# Patient Record
Sex: FEMALE | Race: WHITE | NOT HISPANIC OR LATINO | Employment: FULL TIME | URBAN - METROPOLITAN AREA
[De-identification: names, ages, dates, MRNs, and addresses within clinical notes are randomized per-mention and may not be internally consistent; named-entity substitution may affect disease eponyms.]

---

## 2020-05-28 ENCOUNTER — TELEPHONE (OUTPATIENT)
Dept: OBSTETRICS AND GYNECOLOGY | Facility: CLINIC | Age: 20
End: 2020-05-28

## 2020-05-28 ENCOUNTER — OFFICE VISIT (OUTPATIENT)
Dept: OBSTETRICS AND GYNECOLOGY | Facility: CLINIC | Age: 20
End: 2020-05-28
Payer: COMMERCIAL

## 2020-05-28 VITALS
BODY MASS INDEX: 35.49 KG/M2 | HEIGHT: 65 IN | HEART RATE: 90 BPM | SYSTOLIC BLOOD PRESSURE: 115 MMHG | DIASTOLIC BLOOD PRESSURE: 73 MMHG | WEIGHT: 213 LBS

## 2020-05-28 DIAGNOSIS — A64 STD (FEMALE): ICD-10-CM

## 2020-05-28 DIAGNOSIS — Z20.2 STD EXPOSURE: Primary | ICD-10-CM

## 2020-05-28 DIAGNOSIS — N76.0 ACUTE VAGINITIS: ICD-10-CM

## 2020-05-28 PROCEDURE — 99213 OFFICE O/P EST LOW 20 MIN: CPT | Mod: 25,S$GLB,, | Performed by: OBSTETRICS & GYNECOLOGY

## 2020-05-28 PROCEDURE — 3008F PR BODY MASS INDEX (BMI) DOCUMENTED: ICD-10-PCS | Mod: CPTII,S$GLB,, | Performed by: OBSTETRICS & GYNECOLOGY

## 2020-05-28 PROCEDURE — 3008F BODY MASS INDEX DOCD: CPT | Mod: CPTII,S$GLB,, | Performed by: OBSTETRICS & GYNECOLOGY

## 2020-05-28 PROCEDURE — 87210 SMEAR WET MOUNT SALINE/INK: CPT | Mod: QW,S$GLB,, | Performed by: OBSTETRICS & GYNECOLOGY

## 2020-05-28 PROCEDURE — 87210 PR  SMEAR,STAIN,WET MNT,INTERP: ICD-10-PCS | Mod: QW,S$GLB,, | Performed by: OBSTETRICS & GYNECOLOGY

## 2020-05-28 PROCEDURE — 99213 PR OFFICE/OUTPT VISIT, EST, LEVL III, 20-29 MIN: ICD-10-PCS | Mod: 25,S$GLB,, | Performed by: OBSTETRICS & GYNECOLOGY

## 2020-05-28 RX ORDER — DEXTROAMPHETAMINE SULFATE, DEXTROAMPHETAMINE SACCHARATE, AMPHETAMINE SULFATE AND AMPHETAMINE ASPARTATE 7.5; 7.5; 7.5; 7.5 MG/1; MG/1; MG/1; MG/1
CAPSULE, EXTENDED RELEASE ORAL
COMMUNITY
Start: 2020-03-25

## 2020-05-28 RX ORDER — TINIDAZOLE 500 MG/1
2 TABLET ORAL ONCE
Qty: 8 TABLET | Refills: 0 | Status: SHIPPED | OUTPATIENT
Start: 2020-05-28 | End: 2020-05-28

## 2020-05-28 NOTE — TELEPHONE ENCOUNTER
----- Message from Ashley Rebolledo sent at 5/28/2020  9:32 AM CDT -----  Contact: Pt  Pt would like to consult with the nurse regarding possible exposure to an STD. Pt states that she had and yeast infection and UTI. Please call back at [phone]

## 2020-05-28 NOTE — PROGRESS NOTES
Subjective:       Patient ID: aWnder Stafford is a 19 y.o. female.    Chief Complaint:  Exposure to STD (Pt. states that she got a text from an [employer] number stating that they were a previous partner of hers and tested positive for an std. ) and Vaginal Itching (Went to urgent care last week and was treated for uti and yeast infection. Symptoms have cleared. )      History of Present Illness  She is not on birth control and does not want.  She got a text from an blaine that she might have an STD exposure    She wanted to get checked out      GYN & OB History  No LMP recorded.     Date of Last Pap: No result found    OB History    Para Term  AB Living   0 0 0 0 0 0   SAB TAB Ectopic Multiple Live Births   0 0 0 0 0       Review of Systems  Review of Systems   Constitutional: Negative for activity change.   Eyes: Negative for visual disturbance.   Respiratory: Negative for shortness of breath.    Cardiovascular: Negative for chest pain.   Gastrointestinal: Negative for abdominal pain.   Genitourinary: Negative for vaginal bleeding.        No abnormal vaginal bleeding   Musculoskeletal: Negative for back pain.   Integumentary:  Negative for rash and breast mass.   Neurological: Negative for numbness.   Psychiatric/Behavioral:        No mood disturbance or changes    Breast: Negative for mass            Objective:    Physical Exam:   Constitutional: She appears well-developed.               Genitourinary: Vagina normal and uterus normal. Pelvic exam was performed with patient supine. There is no tenderness or lesion on the right labia. There is no tenderness or lesion on the left labia. Cervix is normal. Labial bartholins normal.  Genitourinary Comments: Slide was examined under the microscope.     +++Clue cells  No Trichomonas  WBC rare   Hyphe  no               Neurological: She is alert.            Assessment:        1. STD (female)                 Plan:           Discussed the limitations of a pelvic  exam   rec serum eval and recheck in 3 months   Pap discussed will return for her annual     Pt is aware we call all results. If she does not hear from our office regarding her result within a week of having a study or procedure performed she is to call the office so that we can research the result for her.  Discussed follow up.  She is to rtn if her symptoms do not resolve or if persist  Chaperone was present

## 2020-05-28 NOTE — TELEPHONE ENCOUNTER
PT C/O OF BURNING, ITCHING, AND PAIN, WENT TO URGENT CARE, GAVE HER ANTIBIOTICS FOR UTI.  SYMPTOMS HAVE RESOLVED, BUT SHE STILL HAS ODOR.  ALSO FEELS LIKE SHE NEEDS TO BE CHECKED FOR AN STD.  PT ADVISED TO COME IN FOR APPT.

## 2020-06-01 LAB
CHLAMYDIA: NEGATIVE
GONORRHEA: NEGATIVE
SOURCE: NORMAL

## 2020-06-02 ENCOUNTER — TELEPHONE (OUTPATIENT)
Dept: OBSTETRICS AND GYNECOLOGY | Facility: CLINIC | Age: 20
End: 2020-06-02

## 2021-03-24 ENCOUNTER — OFFICE VISIT (OUTPATIENT)
Dept: OBSTETRICS AND GYNECOLOGY | Facility: CLINIC | Age: 21
End: 2021-03-24
Payer: COMMERCIAL

## 2021-03-24 ENCOUNTER — PROCEDURE VISIT (OUTPATIENT)
Dept: OBSTETRICS AND GYNECOLOGY | Facility: CLINIC | Age: 21
End: 2021-03-24
Payer: COMMERCIAL

## 2021-03-24 VITALS
WEIGHT: 228 LBS | SYSTOLIC BLOOD PRESSURE: 116 MMHG | BODY MASS INDEX: 38.53 KG/M2 | DIASTOLIC BLOOD PRESSURE: 79 MMHG | HEART RATE: 78 BPM

## 2021-03-24 DIAGNOSIS — Z01.419 WELL WOMAN EXAM WITH ROUTINE GYNECOLOGICAL EXAM: Primary | ICD-10-CM

## 2021-03-24 DIAGNOSIS — R19.00 PELVIC FULLNESS: ICD-10-CM

## 2021-03-24 PROCEDURE — 76856 PR  ECHO,PELVIC (NONOBSTETRIC): ICD-10-PCS | Mod: S$GLB,,, | Performed by: OBSTETRICS & GYNECOLOGY

## 2021-03-24 PROCEDURE — 99395 PREV VISIT EST AGE 18-39: CPT | Mod: S$GLB,,, | Performed by: OBSTETRICS & GYNECOLOGY

## 2021-03-24 PROCEDURE — 99395 PR PREVENTIVE VISIT,EST,18-39: ICD-10-PCS | Mod: S$GLB,,, | Performed by: OBSTETRICS & GYNECOLOGY

## 2021-03-24 PROCEDURE — 3008F PR BODY MASS INDEX (BMI) DOCUMENTED: ICD-10-PCS | Mod: CPTII,S$GLB,, | Performed by: OBSTETRICS & GYNECOLOGY

## 2021-03-24 PROCEDURE — 3008F BODY MASS INDEX DOCD: CPT | Mod: CPTII,S$GLB,, | Performed by: OBSTETRICS & GYNECOLOGY

## 2021-03-24 PROCEDURE — 76856 US EXAM PELVIC COMPLETE: CPT | Mod: S$GLB,,, | Performed by: OBSTETRICS & GYNECOLOGY

## 2021-03-26 LAB
CHLAMYDIA: NEGATIVE
GONORRHEA: NEGATIVE
SOURCE: NORMAL

## 2021-07-01 ENCOUNTER — PATIENT MESSAGE (OUTPATIENT)
Dept: ADMINISTRATIVE | Facility: OTHER | Age: 21
End: 2021-07-01

## 2021-07-19 LAB
RAPID GROUP A STREP (OHS): NEGATIVE
SARS-COV-2 RNA RESP QL NAA+PROBE: NEGATIVE

## 2022-04-09 ENCOUNTER — HISTORICAL (OUTPATIENT)
Dept: ADMINISTRATIVE | Facility: HOSPITAL | Age: 22
End: 2022-04-09
Payer: COMMERCIAL

## 2022-04-27 VITALS
BODY MASS INDEX: 38.75 KG/M2 | SYSTOLIC BLOOD PRESSURE: 132 MMHG | WEIGHT: 232.56 LBS | DIASTOLIC BLOOD PRESSURE: 95 MMHG | HEIGHT: 65 IN | OXYGEN SATURATION: 99 %

## 2022-09-16 ENCOUNTER — HISTORICAL (OUTPATIENT)
Dept: ADMINISTRATIVE | Facility: HOSPITAL | Age: 22
End: 2022-09-16
Payer: COMMERCIAL

## 2022-11-03 ENCOUNTER — PATIENT MESSAGE (OUTPATIENT)
Dept: OBSTETRICS AND GYNECOLOGY | Facility: CLINIC | Age: 22
End: 2022-11-03
Payer: COMMERCIAL

## 2022-11-03 DIAGNOSIS — R79.1 ABNORMAL BLEEDING TIME: ICD-10-CM

## 2022-11-03 DIAGNOSIS — R10.2 PELVIC PAIN: Primary | ICD-10-CM

## 2022-11-04 ENCOUNTER — PROCEDURE VISIT (OUTPATIENT)
Dept: OBSTETRICS AND GYNECOLOGY | Facility: CLINIC | Age: 22
End: 2022-11-04
Payer: COMMERCIAL

## 2022-11-04 DIAGNOSIS — R10.2 PELVIC PAIN: ICD-10-CM

## 2022-11-04 DIAGNOSIS — R79.1 ABNORMAL BLEEDING TIME: ICD-10-CM

## 2022-11-04 PROCEDURE — 76830 US OB/GYN PROCEDURE (VIEWPOINT): ICD-10-PCS | Mod: S$GLB,,, | Performed by: OBSTETRICS & GYNECOLOGY

## 2022-11-04 PROCEDURE — 76830 TRANSVAGINAL US NON-OB: CPT | Mod: S$GLB,,, | Performed by: OBSTETRICS & GYNECOLOGY

## 2022-11-11 ENCOUNTER — PATIENT MESSAGE (OUTPATIENT)
Dept: OBSTETRICS AND GYNECOLOGY | Facility: CLINIC | Age: 22
End: 2022-11-11
Payer: COMMERCIAL

## 2023-05-10 DIAGNOSIS — B37.31 VAGINAL YEAST INFECTION: Primary | ICD-10-CM

## 2023-05-10 RX ORDER — FLUCONAZOLE 150 MG/1
150 TABLET ORAL EVERY OTHER DAY
Qty: 2 TABLET | Refills: 2 | Status: SHIPPED | OUTPATIENT
Start: 2023-05-10

## 2023-05-10 NOTE — TELEPHONE ENCOUNTER
Patient c/o vaginal yeast infection, she is requesting medication. Allergies reviewed. Pharmacy up to date. Medication pending. Please approve.

## 2023-05-10 NOTE — TELEPHONE ENCOUNTER
----- Message from Negrita Marj sent at 5/10/2023  9:33 AM CDT -----  Contact: Patient  Patient called to consult with nurse or staff regarding an issue she is having. She states she would like medication called out for a yeast infection if possible. She would like this medication called out to [facility], [address] [place] [address], [phone]. Patient would laura a call back and can be reached at [phone]. Thanks/MR

## 2023-05-15 ENCOUNTER — TELEPHONE (OUTPATIENT)
Dept: OBSTETRICS AND GYNECOLOGY | Facility: CLINIC | Age: 23
End: 2023-05-15
Payer: COMMERCIAL

## 2023-05-15 NOTE — TELEPHONE ENCOUNTER
Spoke to patient, she states she was given diflucan to treat for yeast infection but her symptoms have not improved. Advised patient to come in for evaluation tomorrow morning. Patient verbalized understanding.

## 2023-05-15 NOTE — TELEPHONE ENCOUNTER
----- Message from Janice Franco sent at 5/15/2023 10:44 AM CDT -----  Regarding: Problems and Concerns  Contact: patient  Per phone call with patient, she stated that she had call for yeast infection medication and it is getting a little better and she has come concerns and would like for the nurse to give her a return call.  Please return call at [phone] (home).    Thanks,  SJ

## 2024-03-04 ENCOUNTER — TELEPHONE (OUTPATIENT)
Dept: OBSTETRICS AND GYNECOLOGY | Facility: CLINIC | Age: 24
End: 2024-03-04
Payer: COMMERCIAL

## 2024-03-04 NOTE — TELEPHONE ENCOUNTER
----- Message from Susan Aguilar sent at 3/4/2024  1:14 PM CST -----  Contact: self  Type: Staff Message    Caller: Wander Stafford  Call Back Number: [phone]  Nature of the Call: pt returning a missed call  Additional Information: na      
Called patient with Dr. Carmona's recommendation of Dr. Keyon Spicer.   
titers do not indicate a need to immunize

## 2024-03-04 NOTE — TELEPHONE ENCOUNTER
Phone call returned to patient. No answer. I did not leave a message and will attempt to reach at a later time.

## 2024-03-04 NOTE — TELEPHONE ENCOUNTER
----- Message from Windy Ray sent at 3/4/2024  8:40 AM CST -----  Type:  Needs Medical Advice    Who Called: Wander Stafford    Symptoms (please be specific): NOB    How long has patient had these symptoms:  -  Pharmacy name and phone #:  -  Would the patient rather a call back or a response via [facility]? CB   Best Call Back Number: [phone]  Additional Information:  pt wants to know if you can refer her to a OB Dr in [place] ( she's moved) please call

## 2024-03-12 ENCOUNTER — TELEPHONE (OUTPATIENT)
Dept: OBSTETRICS AND GYNECOLOGY | Facility: CLINIC | Age: 24
End: 2024-03-12
Payer: COMMERCIAL

## 2024-03-12 DIAGNOSIS — Z34.90 PREGNANCY, UNSPECIFIED GESTATIONAL AGE: Primary | ICD-10-CM

## 2024-03-12 DIAGNOSIS — Z3A.01 LESS THAN 8 WEEKS GESTATION OF PREGNANCY: Primary | ICD-10-CM

## 2024-03-12 NOTE — TELEPHONE ENCOUNTER
Phone call returned to patient  LMP  02/02/2024  G 1   Established with Dr. Carmona  BC/ BS  OB INTAKE scheduled  She lives in [place] and would like to be seen next Wedmesday 03/20/2024

## 2024-03-15 LAB
ABS NRBC COUNT: 0 X 10 3/UL (ref 0–0.01)
ABSOLUTE BASOPHIL: 0.05 X 10 3/UL (ref 0–0.22)
ABSOLUTE EOSINOPHIL: 0.21 X 10 3/UL (ref 0.04–0.54)
ABSOLUTE IMMATURE GRAN: 0.03 X 10 3/UL (ref 0–0.04)
ABSOLUTE LYMPHOCYTE: 2.79 X 10 3/UL (ref 0.86–4.75)
ABSOLUTE MONOCYTE: 0.51 X 10 3/UL (ref 0.22–1.08)
AMORPH URATE CRY URNS QL MICRO: ABNORMAL
ANTIBODY SCREEN: NEGATIVE
B-HCG SERPL-ACNC: 3526 MIU/ML
BACTERIA #/AREA URNS HPF: ABNORMAL /[HPF]
BASOPHILS NFR BLD: 0.5 % (ref 0.2–1.2)
BILIRUB UR QL STRIP: NEGATIVE
BLOOD GROUPING: NORMAL
BLOOD TYPE (D): POSITIVE
CLARITY UR: ABNORMAL
COLOR UR: YELLOW
EOSINOPHIL NFR BLD: 2.2 % (ref 0.7–7)
EPITHELIAL CELLS: ABNORMAL
GLUCOSE (UA): NEGATIVE MG/DL
HBV SURFACE AG SERPL QL IA: NONREACTIVE
HCT VFR BLD AUTO: 39.8 % (ref 37–47)
HCV IGG SERPL QL IA: NONREACTIVE
HGB BLD-MCNC: 12.8 G/DL (ref 12–16)
HIV 1+2 AB+HIV1 P24 AG SERPL QL IA: NONREACTIVE
HSV1 IGG SER-ACNC: NONREACTIVE
HSV2 IGG SER-ACNC: REACTIVE
IMMATURE GRANULOCYTES: 0.3 % (ref 0–0.5)
KETONES UR QL STRIP: NEGATIVE MG/DL
LEUKOCYTE ESTERASE UR QL STRIP: ABNORMAL
LYMPHOCYTES NFR BLD: 28.6 % (ref 19.3–53.1)
MCH RBC QN AUTO: 26.9 PG (ref 27–32)
MCHC RBC AUTO-ENTMCNC: 32.2 G/DL (ref 32–36)
MCV RBC AUTO: 83.8 FL (ref 82–100)
MONOCYTES NFR BLD: 5.2 % (ref 4.7–12.5)
MUCOUS THREADS URNS QL MICRO: NEGATIVE
NEUTROPHILS # BLD AUTO: 6.15 X 10 3/UL (ref 2.15–7.56)
NEUTROPHILS NFR BLD: 63.2 % (ref 34–71.1)
NITRITE UR QL STRIP: NEGATIVE
NUCLEATED RED BLOOD CELLS: 0 /100 WBC (ref 0–0.2)
OCCULT BLOOD: NEGATIVE
PH, URINE: 8 (ref 5–7.5)
PLATELET # BLD AUTO: 300 X 10 3/UL (ref 135–400)
PROGEST SERPL-MCNC: 20.1 NG/ML
PROT UR QL STRIP: NEGATIVE MG/DL
RBC # BLD AUTO: 4.75 X 10 6/UL (ref 4.2–5.4)
RBC/HPF: NEGATIVE
RDW-SD: 44.9 FL (ref 37–54)
RUBELLA IGG SCREEN: NORMAL
SP GR UR STRIP: 1.01 (ref 1–1.03)
SYPHILIS TREPONEMAL ANTIBODY: NONREACTIVE
T4, FREE: 0.92 NG/DL (ref 0.93–1.7)
TSH SERPL DL<=0.005 MIU/L-ACNC: 1.97 UIU/ML (ref 0.27–4.2)
UROBILINOGEN, URINE: NORMAL E.U./DL (ref 0–1)
WBC # BLD: 9.74 X 10 3/UL (ref 4.3–10.8)
WBC/HPF: ABNORMAL

## 2024-03-20 ENCOUNTER — CLINICAL SUPPORT (OUTPATIENT)
Dept: OBSTETRICS AND GYNECOLOGY | Facility: CLINIC | Age: 24
End: 2024-03-20
Payer: COMMERCIAL

## 2024-03-20 VITALS
SYSTOLIC BLOOD PRESSURE: 128 MMHG | HEART RATE: 79 BPM | WEIGHT: 258 LBS | DIASTOLIC BLOOD PRESSURE: 82 MMHG | BODY MASS INDEX: 42.99 KG/M2

## 2024-03-20 DIAGNOSIS — Z34.90 PREGNANCY, UNSPECIFIED GESTATIONAL AGE: Primary | ICD-10-CM

## 2024-03-20 DIAGNOSIS — E66.9 OBESITY, UNSPECIFIED CLASSIFICATION, UNSPECIFIED OBESITY TYPE, UNSPECIFIED WHETHER SERIOUS COMORBIDITY PRESENT: ICD-10-CM

## 2024-03-20 NOTE — PROGRESS NOTES
OB INTAKE   OB labs discussed with patient  HSV 2 positive   She is advised will start Valtrex at 35 weeks.   Patient was unaware and has denied any outbreak.  GDM screening due to family history and obesity  Prenatal Genetic Testing offered and considering.  She does have family history of Down's and Wilson's syndrome. I did encourage patient to have the [facility] Genetic Testing at 12 weeks.  Smoking cessation discussed with patient

## 2024-03-21 ENCOUNTER — PATIENT MESSAGE (OUTPATIENT)
Dept: OBSTETRICS AND GYNECOLOGY | Facility: CLINIC | Age: 24
End: 2024-03-21
Payer: COMMERCIAL

## 2024-03-21 LAB — B-HCG SERPL-ACNC: NORMAL MIU/ML

## 2024-03-22 ENCOUNTER — NURSE TRIAGE (OUTPATIENT)
Dept: ADMINISTRATIVE | Facility: CLINIC | Age: 24
End: 2024-03-22
Payer: COMMERCIAL

## 2024-03-23 NOTE — TELEPHONE ENCOUNTER
"Patient reports she is 7 weeks pregnant and spotting since last night. Patient reports brown d/c and a small blood clot on toilet paper when she wiped just now. Patient denies any abdominal pain, fever, other abnormal vaginal symptoms, or bleeding. Patient reports masturbating x3 yesterday. Advised patient of dispo of home care. Verbalized understanding. Advised to call back if symptoms become worse or with further questions.       Reason for Disposition   Normal vaginal discharge in pregnancy   SPOTTING (single or brief episode)    Additional Information   Negative: [1] Pregnant 23 or more weeks AND [2] baby is moving less today (e.g., kick count < 5 in 1 hour or < 10 in 2 hours)   Negative: Patient sounds very sick or weak to the triager   Negative: [1] Constant abdominal pain AND [2] present > 2 hours   Negative: [1] Intermittent lower abdominal pain AND [2] present > 24 hours   Negative: [1] Pregnant 24-36 weeks () AND [2] pinkish or brownish mucous discharge   Negative: [1] Yellow or green vaginal discharge AND [2] fever   Negative: Painful rash with tiny water blisters   Negative: [1] Rash (e.g., redness, tiny bumps, sore) of genital area AND [2] present > 24 hours   Negative: Abnormal color vaginal discharge (i.e., yellow, green, gray)   Negative: Bad smelling vaginal discharge   Negative: Tender lump (swelling or "ball") at vaginal opening   Negative: [1] Symptoms of a "yeast infection" (i.e., itchy, white discharge, not bad smelling) AND [2] not improved > 3 days following Care Advice   Negative: Patient is worried they have a sexually transmitted infection (STI)   Negative: Pain with sexual intercourse (dyspareunia)   Negative: [1] Pregnant 37 or more weeks (term) AND [2] pinkish or brownish mucous discharge   Negative: [1] Pregnant 37 or more weeks (term) AND [2] passed a small glob or chunk of mucous (may look like gelatin or snot)   Negative: [1] Rash (e.g., redness, tiny bumps, sore) of genital " "area AND [2] present < 24 hours   Negative: Symptoms of a vaginal yeast infection (i.e., white, thick, cottage-cheese-like, itchy, not bad smelling discharge)   Negative: Shock suspected (e.g., cold/pale/clammy skin, too weak to stand, low BP, rapid pulse)   Negative: Difficult to awaken or acting confused (e.g., disoriented, slurred speech)   Negative: Passed out (i.e., lost consciousness, collapsed and was not responding)   Negative: Sounds like a life-threatening emergency to the triager   Negative: SEVERE abdominal pain   Negative: SEVERE vaginal bleeding (e.g., soaking 2 pads per hour or large blood clots and present 2 or more hours)   Negative: SEVERE dizziness (e.g., unable to stand, requires support to walk, feels like passing out)   Negative: [1] MODERATE vaginal bleeding (e.g., soaking 1 pad per hour; clots) AND [2] present > 6 hours   Negative: [1] MODERATE vaginal bleeding (e.g., soaking 1 pad per hour; clots) AND [2] pregnant > 12 weeks   Negative: Passed tissue (e.g., gray-white)   Negative: Shoulder pain   Negative: Pale skin (pallor) of new-onset or worsening   Negative: Patient sounds very sick or weak to the triager   Negative: [1] Constant abdominal pain AND [2] present > 2 hours   Negative: Fever 100.4 F (38.0 C) or higher   Negative: [1] Intermittent lower abdominal pain (e.g., cramping) AND [2] present > 24 hours   Negative: Prior history of "ectopic pregnancy" or previous tubal surgery (e.g., tubal ligation)   Negative: Pain or burning with passing urine (urination)   Negative: Using heparin (e.g., Lovenox) or other strong blood thinner, or known bleeding disorder (e.g., thrombocytopenia)   Negative: MODERATE vaginal bleeding (e.g., soaking 1 pad per hour; clots)   Negative: Has IUD   Negative: MILD vaginal bleeding (i.e., less than 1 pad / hour; less than patient's usual menstrual bleeding; not just spotting)   Negative: SPOTTING lasts > 48 hours or spotting happens more than once in a week   " Negative: Unusual vaginal discharge (e.g., bad smelling, yellow, green, or foamy-white)   Negative: Not feeling pregnant any longer (e.g., breast tenderness or nausea has disappeared)   Negative: SPOTTING after sexual intercourse (single or brief episode)   Negative: SPOTTING after a pelvic examination (single or brief episode)    Protocols used: Pregnancy - Vaginal Ckugqozbr-U-XT, Pregnancy - Vaginal Bleeding Less Than 20 Weeks EGA-A-AH

## 2024-03-25 ENCOUNTER — PATIENT MESSAGE (OUTPATIENT)
Dept: OBSTETRICS AND GYNECOLOGY | Facility: CLINIC | Age: 24
End: 2024-03-25
Payer: COMMERCIAL

## 2024-03-28 ENCOUNTER — PROCEDURE VISIT (OUTPATIENT)
Dept: OBSTETRICS AND GYNECOLOGY | Facility: CLINIC | Age: 24
End: 2024-03-28
Payer: COMMERCIAL

## 2024-03-28 DIAGNOSIS — Z34.90 PREGNANCY, UNSPECIFIED GESTATIONAL AGE: ICD-10-CM

## 2024-03-28 PROCEDURE — 76817 TRANSVAGINAL US OBSTETRIC: CPT | Mod: S$GLB,,, | Performed by: OBSTETRICS & GYNECOLOGY

## 2024-04-09 DIAGNOSIS — K20.90 ESOPHAGITIS: ICD-10-CM

## 2024-04-09 DIAGNOSIS — O21.9 VOMITING AFFECTING PREGNANCY: Primary | ICD-10-CM

## 2024-04-09 RX ORDER — ONDANSETRON 8 MG/1
8 TABLET, ORALLY DISINTEGRATING ORAL EVERY 8 HOURS PRN
Qty: 30 TABLET | Refills: 1 | Status: SHIPPED | OUTPATIENT
Start: 2024-04-09

## 2024-04-09 NOTE — TELEPHONE ENCOUNTER
Wander,     I am sorry you are feeling bad.  We can send out a prescription of Zofran to treat nausea and vomiting.  It is not uncommon to have a small amount of blood from frequent vomiting.  You may also take Nexium 20 mg early morning dose. This medication is over the counter.  If your symptoms do not improve with the Zofran please reach out to me.  Hope you feel better soon!    Jacque   ===View-only below this line===      ----- Message -----       From:Wander Stafford       Sent:4/9/2024  8:52 AM CDT         To:User Message Message List    Subject:spotting    Goodmorning, I was just messaging because I was throwing up this morning and it was all stomach acid but there was a little bit of blood in it too. I got sick a lot yesterday and it was basically all stomach acid so I figured maybe it just irritated my esophagus but I just wanted to see what you think like if thats normal or okay       ----- Message -----       From:Nurse Santillan       Sent:4/1/2024 11:48 AM CDT         To:Wander Stafford    Subject:spotting    Danilo,    I scheduled you for Wednesday May 1 at 2:00  See you then!    Jacque       ----- Message -----       From:Wander Stafford       Sent:4/1/2024  9:27 AM CDT         To:User Message Message List    Subject:spotting    Wednesdays at whatever time preferably early afternoon       ----- Message -----       From:Nurse Santillan       Sent:4/1/2024  9:19 AM CDT         To:Wander Stafford    Subject:spotting    Good morning Wander,     What day and time of day works best for you?    Jacque       ----- Message -----       From:Wander Stafford       Sent:3/30/2024  3:12 PM CDT         To:User Message Message List    Subject:spotting    I will do the genetic testing at 12 weeks if we can go ahead and get an appointment set so I can take off !       ----- Message -----       From:Nurse Santillan       Sent:3/26/2024  8:36 AM CDT         To:Wander Stafford    Subject:spotting    Okay great!  I hope you have a great day!    Jacque

## 2024-04-15 ENCOUNTER — INITIAL PRENATAL (OUTPATIENT)
Dept: OBSTETRICS AND GYNECOLOGY | Facility: CLINIC | Age: 24
End: 2024-04-15
Payer: COMMERCIAL

## 2024-04-15 VITALS
DIASTOLIC BLOOD PRESSURE: 85 MMHG | SYSTOLIC BLOOD PRESSURE: 121 MMHG | WEIGHT: 255 LBS | HEART RATE: 85 BPM | BODY MASS INDEX: 42.49 KG/M2

## 2024-04-15 DIAGNOSIS — Z3A.01 LESS THAN 8 WEEKS GESTATION OF PREGNANCY: ICD-10-CM

## 2024-04-15 DIAGNOSIS — Z34.90 PREGNANCY, UNSPECIFIED GESTATIONAL AGE: Primary | ICD-10-CM

## 2024-04-15 PROCEDURE — 0500F INITIAL PRENATAL CARE VISIT: CPT | Mod: CPTII,S$GLB,, | Performed by: OBSTETRICS & GYNECOLOGY

## 2024-04-15 RX ORDER — ONDANSETRON 8 MG/1
8 TABLET, ORALLY DISINTEGRATING ORAL EVERY 8 HOURS PRN
Qty: 18 TABLET | Refills: 1 | Status: SHIPPED | OUTPATIENT
Start: 2024-04-15

## 2024-04-15 NOTE — PROGRESS NOTES
CHIEF COMPLAINT:   Patient presents with      initial OB        HISTORY OF PRESENT ILLNESS  Wander Stafford 23 y.o.  presents for initial OB  The patient has no complaints today.  Nausea discussed and fluid intake including sprite, gator aid, cool aid,    No bleeding or pain.    Genetic testing is discussed in detail with her .   Denies chemical/pesticide/radiation exposure.    OB history:  G1    LMP: Patient's last menstrual period was 2024.  EDC: Estimated Date of Delivery: 24  EGA: 9w5d       Health Maintenance   Topic Date Due    Lipid Panel  Never done    Pap Smear  2022    Chlamydia Screening  2024    TETANUS VACCINE  2033    Hepatitis C Screening  Completed       Past Medical History:   Diagnosis Date    HSV-2 infection     Possible exposure to STD     Yeast infection        Past Surgical History:   Procedure Laterality Date    WISDOM TOOTH EXTRACTION         Family History   Problem Relation Name Age of Onset    Diabetes Father      Diabetes Mellitus Other          UNSPECIFIED    Ovarian cancer Maternal Great-Grandmother      Breast cancer Neg Hx      Cancer Neg Hx      Colon cancer Neg Hx      Eclampsia Neg Hx      Hypertension Neg Hx      Miscarriages / Stillbirths Neg Hx       labor Neg Hx      Stroke Neg Hx      Uterine cancer Neg Hx      Cervical cancer Neg Hx         Social History     Socioeconomic History    Marital status: Single   Tobacco Use    Smoking status: Former     Types: Vaping with nicotine    Smokeless tobacco: Never    Tobacco comments:     Patient counseled smoking cessation   Substance and Sexual Activity    Alcohol use: Not Currently    Drug use: Yes     Types: Marijuana    Sexual activity: Yes     Partners: Male     Birth control/protection: None       Current Outpatient Medications   Medication Sig Dispense Refill    ondansetron (ZOFRAN-ODT) 8 MG TbDL Take 1 tablet (8 mg total) by mouth every 8 (eight) hours as needed (NAUSEA). 30 tablet 1     prenatal vit37/iron/folic acid (PRENATA ORAL) Take by mouth.      ADDERALL XR 30 mg 24 hr capsule       fluconazole (DIFLUCAN) 150 MG Tab Take 1 tablet (150 mg total) by mouth every other day. 2 tablet 2    norethindrone-e.estradiol-iron (LO LOESTRIN FE ORAL) Take by mouth.       No current facility-administered medications for this visit.       Review of patient's allergies indicates:  No Known Allergies      PHYSICAL EXAM   Vitals:    04/15/24 0933   BP: 121/85   Pulse: 85        PAIN SCALE: 0/10 None    PHYSICAL EXAM    ROS:  GENERAL: No fever, chills, fatigability.  CV: Denies chest pain  PULM: Denies shortness of breath or wheezing.  ABDOMEN denies  abdominal pain, hematemesis or blood in stool.  URINARY: No flank pain, dysuria or hematuria.  REPRODUCTIVE: No abnormal vaginal bleeding.       PE:   APPEARANCE: Well nourished, well developed, in no acute distress  CHEST: Clear to auscultation bilaterally  CV: Regular rate and rhythm  BREASTS: Symmetrical, no skin changes or visible lesions. No palpable masses, nipple discharge or adenopathy bilaterally. Mild bilateral fibrocystic changes  ABDOMEN: Soft. No tenderness or masses. No hepatosplenomegaly. No hernias  PELVIC:   VULVA: No lesions. Normal female genitalia.  URETHRAL MEATUS: Normal size and location, no lesions, no prolapse.  URETHRA: No masses, tenderness, prolapse or scarring.  VAGINA: Moist and well rugated, no discharge, no significant cystocele or rectocele.  CERVIX: No lesions, normal diameter, no stenosis, no cervical motion tenderness.   UTERUS: 10 size, regular shape, mobile, non-tender, normal position, good support.  ADNEXA: No masses, tenderness or CDS nodularity.  ANUS PERINEUM: No lesions, no relaxation, no external hemorrhoids.     UPT +    A/P:     -      Patient was counseled today on A.C.S. Pap guidelines and recommendations for yearly pelvic exams, mammograms and monthly self breast exams; to see her PCP for other health maintenance  and pregnancy.   -      Patient's medications and medical history reviewed with patient and implications in pregnancy.   -      Pregnancy course discussed and 'AtoZ' book given. Patient was counseled on proper weight gain based on the [employer]'s recommendations based on her pre-pregnancy weight. Discussed foods to avoid in pregnancy (i.e. sushi, fish that are high in mercury, deli meat, and unpasteurized cheeses). Discussed prenatal vitamin options (i.e. stool softener, DHA). Discussed potential medical problems in pregnancy.  -     Discussed risk of Toxoplasmosis transmission from pets and reviewed risk reduction techniques.  -      -     Chromosomal abnormality risk discussed and available testing offered - declined  This is discussed.      -     Pt was counseled on exercise in pregnancy and weight gain recommendations.  -     Pt was counseled on travel recommendations and on risks of Covid 19 virus exposure.    -     Covid prevention reviewed with patient  -     Oriented to practice including call coverage   -     Follow-up routine OB  labs and u/s-- .   -       Pt is aware we call all results. If she does not hear from our office regarding her result within a week of having a study or procedure performed she is to call the office so that we can research the result for her as I do not know when she is scheduling her procedures.

## 2024-04-17 LAB
CHLAMYDIA: NEGATIVE
CHLAMYDIA: NEGATIVE
GONORRHEA: NEGATIVE
GONORRHEA: NEGATIVE
Lab: NORMAL
SOURCE: NORMAL
SOURCE: NORMAL

## 2024-04-19 ENCOUNTER — TELEPHONE (OUTPATIENT)
Dept: OBSTETRICS AND GYNECOLOGY | Facility: CLINIC | Age: 24
End: 2024-04-19
Payer: COMMERCIAL

## 2024-04-19 NOTE — TELEPHONE ENCOUNTER
----- Message from Silvio Carmona III, MD sent at 4/18/2024  5:54 PM CDT -----  I want to let you know that your pap smear is normal

## 2024-04-25 ENCOUNTER — PATIENT MESSAGE (OUTPATIENT)
Dept: OBSTETRICS AND GYNECOLOGY | Facility: CLINIC | Age: 24
End: 2024-04-25
Payer: COMMERCIAL

## 2024-04-25 ENCOUNTER — TELEPHONE (OUTPATIENT)
Dept: OBSTETRICS AND GYNECOLOGY | Facility: CLINIC | Age: 24
End: 2024-04-25
Payer: COMMERCIAL

## 2024-04-25 NOTE — TELEPHONE ENCOUNTER
I have returned patient's call once again.  No answer. I left a message requesting patient reach out in the Patient Portal. Give me a date you would like to reschedule your appointment. I will wait to hear from patient.

## 2024-04-25 NOTE — TELEPHONE ENCOUNTER
----- Message from Iris Mcguire sent at 4/25/2024 10:07 AM CDT -----  Patient is requesting a call back in regard to visit scheduled on 5/1/24 please call her back at [phone]

## 2024-04-25 NOTE — TELEPHONE ENCOUNTER
----- Message from Balbina Ambriz sent at 4/25/2024 11:34 AM CDT -----  Regarding: missed call  Contact: pt  Pt returning a missed call and can be reached at [phone]    Thanks,

## 2024-04-25 NOTE — TELEPHONE ENCOUNTER
Phone call returned to patient. No answer. I left a message on patient's voicemail to return call   I will attempt to reach at a later time.

## 2024-04-29 ENCOUNTER — CLINICAL SUPPORT (OUTPATIENT)
Dept: OBSTETRICS AND GYNECOLOGY | Facility: CLINIC | Age: 24
End: 2024-04-29
Payer: COMMERCIAL

## 2024-04-29 DIAGNOSIS — Z34.90 PREGNANCY, UNSPECIFIED GESTATIONAL AGE: Primary | ICD-10-CM

## 2024-04-29 NOTE — PROGRESS NOTES
Patient is in to discuss Prenatal Genetic Testing.   [facility] ordered.  Patient is advised to have drawn at 12 weeks gestation.  This study takes two weeks to process.

## 2024-05-04 ENCOUNTER — PATIENT MESSAGE (OUTPATIENT)
Dept: OBSTETRICS AND GYNECOLOGY | Facility: CLINIC | Age: 24
End: 2024-05-04
Payer: COMMERCIAL

## 2024-05-07 ENCOUNTER — PATIENT MESSAGE (OUTPATIENT)
Dept: OBSTETRICS AND GYNECOLOGY | Facility: CLINIC | Age: 24
End: 2024-05-07
Payer: COMMERCIAL

## 2024-05-29 ENCOUNTER — CLINICAL SUPPORT (OUTPATIENT)
Dept: OBSTETRICS AND GYNECOLOGY | Facility: CLINIC | Age: 24
End: 2024-05-29
Payer: COMMERCIAL

## 2024-05-29 ENCOUNTER — PATIENT MESSAGE (OUTPATIENT)
Dept: OTHER | Facility: OTHER | Age: 24
End: 2024-05-29
Payer: COMMERCIAL

## 2024-05-29 VITALS
WEIGHT: 255.81 LBS | DIASTOLIC BLOOD PRESSURE: 77 MMHG | SYSTOLIC BLOOD PRESSURE: 115 MMHG | HEART RATE: 92 BPM | BODY MASS INDEX: 42.62 KG/M2

## 2024-05-29 DIAGNOSIS — O99.210 OBESITY IN PREGNANCY: ICD-10-CM

## 2024-05-29 DIAGNOSIS — Z3A.20 20 WEEKS GESTATION OF PREGNANCY: Primary | ICD-10-CM

## 2024-05-29 NOTE — PROGRESS NOTES
Patient is complaining of hip and back pain   She works as a , on her feet and carrying trays  Denies urinary symptoms  She is referred to Dr. Bárbara Oneal, Chiropractor  Denies HSV outbreak  Patient is reminded to have the One Hour GTT

## 2024-06-05 ENCOUNTER — PATIENT MESSAGE (OUTPATIENT)
Dept: OTHER | Facility: OTHER | Age: 24
End: 2024-06-05
Payer: COMMERCIAL

## 2024-06-20 ENCOUNTER — PATIENT MESSAGE (OUTPATIENT)
Dept: OBSTETRICS AND GYNECOLOGY | Facility: CLINIC | Age: 24
End: 2024-06-20
Payer: COMMERCIAL

## 2024-06-24 ENCOUNTER — ROUTINE PRENATAL (OUTPATIENT)
Dept: OBSTETRICS AND GYNECOLOGY | Facility: CLINIC | Age: 24
End: 2024-06-24
Payer: COMMERCIAL

## 2024-06-24 VITALS
WEIGHT: 259 LBS | DIASTOLIC BLOOD PRESSURE: 87 MMHG | BODY MASS INDEX: 43.15 KG/M2 | SYSTOLIC BLOOD PRESSURE: 138 MMHG | HEART RATE: 99 BPM

## 2024-06-24 DIAGNOSIS — Z34.90 PREGNANCY, UNSPECIFIED GESTATIONAL AGE: Primary | ICD-10-CM

## 2024-06-24 PROCEDURE — 0502F SUBSEQUENT PRENATAL CARE: CPT | Mod: CPTII,S$GLB,, | Performed by: OBSTETRICS & GYNECOLOGY

## 2024-06-24 RX ORDER — HYDROXYZINE PAMOATE 25 MG/1
25 CAPSULE ORAL 4 TIMES DAILY
Qty: 18 CAPSULE | Refills: 1 | Status: SHIPPED | OUTPATIENT
Start: 2024-06-24

## 2024-06-24 RX ORDER — ASPIRIN 81 MG/1
81 TABLET ORAL DAILY
COMMUNITY
Start: 2024-06-24 | End: 2025-03-31

## 2024-06-24 NOTE — PROGRESS NOTES
23 y.o. female  at 19w5d   Starting to feel flutters/FM, denies VB, leaking of fluid, cramping  Doing well   TW  anatomy US  Reviewed warning signs, normal FM,  labor precautions and how/when to call.  RTC x 4 wks, call or present sooner prn.   Covid vaccine recommended    She has a rash on her arm and her inner legs and on her breast.    Zyrtec  vistaril   Good fetal mvts

## 2024-06-26 ENCOUNTER — PATIENT MESSAGE (OUTPATIENT)
Dept: OTHER | Facility: OTHER | Age: 24
End: 2024-06-26
Payer: COMMERCIAL

## 2024-06-27 ENCOUNTER — PROCEDURE VISIT (OUTPATIENT)
Dept: OBSTETRICS AND GYNECOLOGY | Facility: CLINIC | Age: 24
End: 2024-06-27
Payer: COMMERCIAL

## 2024-06-27 DIAGNOSIS — Z3A.20 20 WEEKS GESTATION OF PREGNANCY: ICD-10-CM

## 2024-06-27 PROCEDURE — 76805 OB US >/= 14 WKS SNGL FETUS: CPT | Mod: S$GLB,,, | Performed by: OBSTETRICS & GYNECOLOGY

## 2024-07-04 ENCOUNTER — PATIENT MESSAGE (OUTPATIENT)
Dept: OBSTETRICS AND GYNECOLOGY | Facility: CLINIC | Age: 24
End: 2024-07-04
Payer: COMMERCIAL

## 2024-07-18 DIAGNOSIS — Z36.2 ENCOUNTER FOR FOLLOW-UP ULTRASOUND OF FETAL ANATOMY: Primary | ICD-10-CM

## 2024-07-24 ENCOUNTER — PATIENT MESSAGE (OUTPATIENT)
Dept: OTHER | Facility: OTHER | Age: 24
End: 2024-07-24
Payer: COMMERCIAL

## 2024-07-25 ENCOUNTER — PROCEDURE VISIT (OUTPATIENT)
Dept: OBSTETRICS AND GYNECOLOGY | Facility: CLINIC | Age: 24
End: 2024-07-25
Payer: COMMERCIAL

## 2024-07-25 DIAGNOSIS — Z36.2 ENCOUNTER FOR FOLLOW-UP ULTRASOUND OF FETAL ANATOMY: ICD-10-CM

## 2024-07-30 ENCOUNTER — PATIENT MESSAGE (OUTPATIENT)
Dept: OBSTETRICS AND GYNECOLOGY | Facility: CLINIC | Age: 24
End: 2024-07-30
Payer: COMMERCIAL

## 2024-08-07 ENCOUNTER — PATIENT MESSAGE (OUTPATIENT)
Dept: OTHER | Facility: OTHER | Age: 24
End: 2024-08-07
Payer: COMMERCIAL

## 2024-08-12 ENCOUNTER — PATIENT MESSAGE (OUTPATIENT)
Dept: OBSTETRICS AND GYNECOLOGY | Facility: CLINIC | Age: 24
End: 2024-08-12
Payer: COMMERCIAL

## 2024-08-21 ENCOUNTER — PATIENT MESSAGE (OUTPATIENT)
Dept: OTHER | Facility: OTHER | Age: 24
End: 2024-08-21
Payer: COMMERCIAL

## 2024-08-22 ENCOUNTER — ROUTINE PRENATAL (OUTPATIENT)
Dept: OBSTETRICS AND GYNECOLOGY | Facility: CLINIC | Age: 24
End: 2024-08-22
Payer: COMMERCIAL

## 2024-08-22 VITALS
BODY MASS INDEX: 45.15 KG/M2 | WEIGHT: 271 LBS | HEART RATE: 87 BPM | DIASTOLIC BLOOD PRESSURE: 89 MMHG | SYSTOLIC BLOOD PRESSURE: 140 MMHG

## 2024-08-22 DIAGNOSIS — Z34.90 PREGNANCY, UNSPECIFIED GESTATIONAL AGE: ICD-10-CM

## 2024-08-22 DIAGNOSIS — O99.810 ABNORMAL GLUCOSE TOLERANCE IN PREGNANCY: Primary | ICD-10-CM

## 2024-08-22 NOTE — PROGRESS NOTES
24 y.o. female  at 28w1d   Reports + FM, denies VB, Leaking fluid  or CTX  Doing well   TW  28wk labs today 166   Rhogam - Not needed   Tdap discussed  Reviewed warning signs, normal fetal mvts discussed  ,  labor precautions and how/when to call.  RTC x 2 wks, call or present sooner prn.

## 2024-08-30 ENCOUNTER — PATIENT MESSAGE (OUTPATIENT)
Dept: OBSTETRICS AND GYNECOLOGY | Facility: CLINIC | Age: 24
End: 2024-08-30
Payer: COMMERCIAL

## 2024-09-04 ENCOUNTER — PATIENT MESSAGE (OUTPATIENT)
Dept: OTHER | Facility: OTHER | Age: 24
End: 2024-09-04
Payer: COMMERCIAL

## 2024-09-09 ENCOUNTER — PATIENT MESSAGE (OUTPATIENT)
Dept: OBSTETRICS AND GYNECOLOGY | Facility: CLINIC | Age: 24
End: 2024-09-09
Payer: COMMERCIAL

## 2024-09-13 ENCOUNTER — LAB VISIT (OUTPATIENT)
Dept: LAB | Facility: HOSPITAL | Age: 24
End: 2024-09-13
Attending: OBSTETRICS & GYNECOLOGY
Payer: COMMERCIAL

## 2024-09-13 ENCOUNTER — PATIENT MESSAGE (OUTPATIENT)
Dept: OBSTETRICS AND GYNECOLOGY | Facility: CLINIC | Age: 24
End: 2024-09-13
Payer: COMMERCIAL

## 2024-09-13 DIAGNOSIS — O99.810 ABNORMAL MATERNAL GLUCOSE TOLERANCE, ANTEPARTUM: Primary | ICD-10-CM

## 2024-09-13 LAB
GLUCOSE 1H P 100 G GLC PO SERPL-MCNC: 196 MG/DL (ref 100–180)
GLUCOSE 2H P 100 G GLC PO SERPL-MCNC: 201 MG/DL (ref 70–140)
GLUCOSE 3H P 100 G GLC PO SERPL-MCNC: 122 MG/DL (ref 70–115)
GLUCOSE P FAST SERPL-MCNC: 102 MG/DL (ref 70–100)
PATH REV: NORMAL

## 2024-09-13 PROCEDURE — 36415 COLL VENOUS BLD VENIPUNCTURE: CPT

## 2024-09-13 PROCEDURE — 82947 ASSAY GLUCOSE BLOOD QUANT: CPT

## 2024-09-13 PROCEDURE — 82951 GLUCOSE TOLERANCE TEST (GTT): CPT

## 2024-09-13 PROCEDURE — 82950 GLUCOSE TEST: CPT

## 2024-09-16 ENCOUNTER — CLINICAL SUPPORT (OUTPATIENT)
Dept: OBSTETRICS AND GYNECOLOGY | Facility: CLINIC | Age: 24
End: 2024-09-16
Payer: COMMERCIAL

## 2024-09-16 VITALS
SYSTOLIC BLOOD PRESSURE: 128 MMHG | WEIGHT: 275.81 LBS | DIASTOLIC BLOOD PRESSURE: 80 MMHG | BODY MASS INDEX: 45.95 KG/M2 | HEART RATE: 90 BPM

## 2024-09-16 DIAGNOSIS — O24.419 GESTATIONAL DIABETES MELLITUS (GDM) IN THIRD TRIMESTER, GESTATIONAL DIABETES METHOD OF CONTROL UNSPECIFIED: Primary | ICD-10-CM

## 2024-09-16 LAB — POCT GLUCOSE: 100 MG/DL (ref 70–110)

## 2024-09-16 RX ORDER — DEXTROSE 4 G
1 TABLET,CHEWABLE ORAL 4 TIMES DAILY
Qty: 1 EACH | Refills: 0 | Status: SHIPPED | OUTPATIENT
Start: 2024-09-16

## 2024-09-16 NOTE — TELEPHONE ENCOUNTER
----- Message from Laure Huddleston MA sent at 9/16/2024  2:56 PM CDT -----  Contact: pt    ----- Message -----  From: Balbina Ambriz  Sent: 9/16/2024   2:45 PM CDT  To: Mathew Anguiano III Staff    Pt calling about script left for script accu check fast clix lancets to be sent to [facility].  Pt ca be reached at [phone].  Pt left message today.        [facility]/pharmacy #[id number] - [place] - [address]  [address] [facility] [address]  Phone: [phone] Fax: [phone]

## 2024-09-16 NOTE — TELEPHONE ENCOUNTER
Wander,      I will send the script for an Accucheck devise and supplies.  Dr. Carmona will sign off on prescriptions after clinic.  Diana from [facility] [employer] did not have time to see you today.  She will reach out to you soon!       Jacque     This [employer] message has not been read.     Laure Huddleston MA routed conversation to You2 hours ago (12:53 PM)     Wander Anguiano III Staff (supporting Silvio Carmona III, MD)2 hours ago (12:53 PM)       My insurance company said if you call out a accucheck guide check strip and the accu check fast clix lancets to [facility] then I can call this 1-800 number and get a free meter but they need the prescriptions called out first

## 2024-09-18 ENCOUNTER — PATIENT MESSAGE (OUTPATIENT)
Dept: OTHER | Facility: OTHER | Age: 24
End: 2024-09-18
Payer: COMMERCIAL

## 2024-09-24 ENCOUNTER — PATIENT MESSAGE (OUTPATIENT)
Dept: OBSTETRICS AND GYNECOLOGY | Facility: CLINIC | Age: 24
End: 2024-09-24
Payer: COMMERCIAL

## 2024-09-27 ENCOUNTER — PATIENT MESSAGE (OUTPATIENT)
Dept: OBSTETRICS AND GYNECOLOGY | Facility: CLINIC | Age: 24
End: 2024-09-27
Payer: COMMERCIAL

## 2024-09-27 DIAGNOSIS — O24.419 GESTATIONAL DIABETES MELLITUS (GDM) IN THIRD TRIMESTER, GESTATIONAL DIABETES METHOD OF CONTROL UNSPECIFIED: Primary | ICD-10-CM

## 2024-09-27 RX ORDER — METFORMIN HYDROCHLORIDE 500 MG/1
500 TABLET, EXTENDED RELEASE ORAL NIGHTLY
Qty: 30 TABLET | Refills: 4 | Status: SHIPPED | OUTPATIENT
Start: 2024-09-27

## 2024-09-27 NOTE — TELEPHONE ENCOUNTER
Dr. Mathew Viramontes reviewed your Glucose log.  Your Fasting Glucose are elevated. He would like you to start taking Metformin 500 mg at bedtime.  Send me you Glucose log next week and hopefully the Metformin will help in maintain normal glucose     Jacque

## 2024-10-01 ENCOUNTER — PROCEDURE VISIT (OUTPATIENT)
Dept: OBSTETRICS AND GYNECOLOGY | Facility: CLINIC | Age: 24
End: 2024-10-01
Payer: COMMERCIAL

## 2024-10-01 ENCOUNTER — ROUTINE PRENATAL (OUTPATIENT)
Dept: OBSTETRICS AND GYNECOLOGY | Facility: CLINIC | Age: 24
End: 2024-10-01
Payer: COMMERCIAL

## 2024-10-01 VITALS
BODY MASS INDEX: 45.32 KG/M2 | WEIGHT: 272 LBS | DIASTOLIC BLOOD PRESSURE: 85 MMHG | SYSTOLIC BLOOD PRESSURE: 123 MMHG | HEART RATE: 73 BPM

## 2024-10-01 DIAGNOSIS — O24.410 DIET CONTROLLED GESTATIONAL DIABETES MELLITUS (GDM) IN THIRD TRIMESTER: Primary | ICD-10-CM

## 2024-10-01 DIAGNOSIS — O24.419 GESTATIONAL DIABETES MELLITUS (GDM) IN THIRD TRIMESTER, GESTATIONAL DIABETES METHOD OF CONTROL UNSPECIFIED: ICD-10-CM

## 2024-10-01 DIAGNOSIS — O24.410 DIET CONTROLLED GESTATIONAL DIABETES MELLITUS (GDM) IN THIRD TRIMESTER: ICD-10-CM

## 2024-10-01 DIAGNOSIS — Z34.90 PREGNANCY, UNSPECIFIED GESTATIONAL AGE: Primary | ICD-10-CM

## 2024-10-01 PROCEDURE — 76816 OB US FOLLOW-UP PER FETUS: CPT | Mod: 26,S$PBB,, | Performed by: OBSTETRICS & GYNECOLOGY

## 2024-10-01 PROCEDURE — 0502F SUBSEQUENT PRENATAL CARE: CPT | Mod: CPTII,,, | Performed by: OBSTETRICS & GYNECOLOGY

## 2024-10-01 PROCEDURE — 76819 FETAL BIOPHYS PROFIL W/O NST: CPT | Mod: 26,S$PBB,, | Performed by: OBSTETRICS & GYNECOLOGY

## 2024-10-01 NOTE — PROGRESS NOTES
24 y.o. female  at 33w6d   Reports + FM, denies VB, leaking of fluid, or cramping  Doing well   TW  Reviewed upcoming 28wk labs, (and orders placed, tdap handout provided and explained  Reviewed warning signs, normal FM,  labor precautions and how/when to call.  RTC x 4 wks, call or present sooner prn.    On metformin to  two days  her fasting is 96   her 2 hours are less than 120

## 2024-10-07 ENCOUNTER — PATIENT MESSAGE (OUTPATIENT)
Dept: OBSTETRICS AND GYNECOLOGY | Facility: CLINIC | Age: 24
End: 2024-10-07
Payer: COMMERCIAL

## 2024-10-08 ENCOUNTER — PROCEDURE VISIT (OUTPATIENT)
Dept: OBSTETRICS AND GYNECOLOGY | Facility: CLINIC | Age: 24
End: 2024-10-08
Payer: COMMERCIAL

## 2024-10-08 ENCOUNTER — ROUTINE PRENATAL (OUTPATIENT)
Dept: OBSTETRICS AND GYNECOLOGY | Facility: CLINIC | Age: 24
End: 2024-10-08
Payer: COMMERCIAL

## 2024-10-08 VITALS
HEART RATE: 91 BPM | BODY MASS INDEX: 45.52 KG/M2 | WEIGHT: 273.19 LBS | DIASTOLIC BLOOD PRESSURE: 75 MMHG | SYSTOLIC BLOOD PRESSURE: 121 MMHG

## 2024-10-08 DIAGNOSIS — Z34.93 ENCOUNTER FOR SUPERVISION OF NORMAL PREGNANCY IN THIRD TRIMESTER, UNSPECIFIED GRAVIDITY: Primary | ICD-10-CM

## 2024-10-08 DIAGNOSIS — O24.410 DIET CONTROLLED GESTATIONAL DIABETES MELLITUS (GDM) IN THIRD TRIMESTER: ICD-10-CM

## 2024-10-08 DIAGNOSIS — Z34.90 PREGNANCY, UNSPECIFIED GESTATIONAL AGE: ICD-10-CM

## 2024-10-08 PROCEDURE — 76819 FETAL BIOPHYS PROFIL W/O NST: CPT | Mod: 26,S$PBB,, | Performed by: OBSTETRICS & GYNECOLOGY

## 2024-10-08 PROCEDURE — 0502F SUBSEQUENT PRENATAL CARE: CPT | Mod: CPTII,,, | Performed by: OBSTETRICS & GYNECOLOGY

## 2024-10-08 RX ORDER — BLOOD-GLUCOSE METER
KIT MISCELLANEOUS
COMMUNITY
Start: 2024-09-17

## 2024-10-09 ENCOUNTER — PATIENT MESSAGE (OUTPATIENT)
Dept: OTHER | Facility: OTHER | Age: 24
End: 2024-10-09
Payer: COMMERCIAL

## 2024-10-09 DIAGNOSIS — O24.419 GESTATIONAL DIABETES MELLITUS (GDM) IN THIRD TRIMESTER, GESTATIONAL DIABETES METHOD OF CONTROL UNSPECIFIED: Primary | ICD-10-CM

## 2024-10-10 LAB
GROUP B STREP MOLECULAR: POSITIVE
PENICILLIN ALLERGIC: NO

## 2024-10-15 ENCOUNTER — PATIENT MESSAGE (OUTPATIENT)
Dept: OBSTETRICS AND GYNECOLOGY | Facility: CLINIC | Age: 24
End: 2024-10-15

## 2024-10-15 ENCOUNTER — ROUTINE PRENATAL (OUTPATIENT)
Dept: OBSTETRICS AND GYNECOLOGY | Facility: CLINIC | Age: 24
End: 2024-10-15
Payer: COMMERCIAL

## 2024-10-15 ENCOUNTER — PROCEDURE VISIT (OUTPATIENT)
Dept: OBSTETRICS AND GYNECOLOGY | Facility: CLINIC | Age: 24
End: 2024-10-15
Payer: COMMERCIAL

## 2024-10-15 VITALS
WEIGHT: 277.38 LBS | HEART RATE: 87 BPM | SYSTOLIC BLOOD PRESSURE: 118 MMHG | BODY MASS INDEX: 46.22 KG/M2 | DIASTOLIC BLOOD PRESSURE: 77 MMHG

## 2024-10-15 DIAGNOSIS — Z34.90 PREGNANCY, UNSPECIFIED GESTATIONAL AGE: Primary | ICD-10-CM

## 2024-10-15 DIAGNOSIS — O24.419 GESTATIONAL DIABETES MELLITUS (GDM) IN THIRD TRIMESTER, GESTATIONAL DIABETES METHOD OF CONTROL UNSPECIFIED: ICD-10-CM

## 2024-10-15 PROCEDURE — 0502F SUBSEQUENT PRENATAL CARE: CPT | Mod: CPTII,,, | Performed by: OBSTETRICS & GYNECOLOGY

## 2024-10-15 PROCEDURE — 76819 FETAL BIOPHYS PROFIL W/O NST: CPT | Mod: 26,S$PBB,, | Performed by: OBSTETRICS & GYNECOLOGY

## 2024-10-15 NOTE — PROGRESS NOTES
24 y.o. female  at 35w6d   Denies VB,  Nausea discussed   Doing well   TW  Reviewed prenatal labs  Genetic testing offered and discussed   Dating ultrasound discussed   Reviewed warning signs, pregnancy precautions and how/when to call.  RTC x 4 wks, call or present sooner prn.   Covid vaccine recommended     Her glucose is 80-90

## 2024-10-22 ENCOUNTER — ROUTINE PRENATAL (OUTPATIENT)
Dept: OBSTETRICS AND GYNECOLOGY | Facility: CLINIC | Age: 24
End: 2024-10-22
Payer: COMMERCIAL

## 2024-10-22 ENCOUNTER — PROCEDURE VISIT (OUTPATIENT)
Dept: OBSTETRICS AND GYNECOLOGY | Facility: CLINIC | Age: 24
End: 2024-10-22
Payer: COMMERCIAL

## 2024-10-22 VITALS
SYSTOLIC BLOOD PRESSURE: 140 MMHG | HEART RATE: 90 BPM | WEIGHT: 274.81 LBS | DIASTOLIC BLOOD PRESSURE: 82 MMHG | BODY MASS INDEX: 45.78 KG/M2

## 2024-10-22 DIAGNOSIS — Z34.90 PREGNANCY, UNSPECIFIED GESTATIONAL AGE: Primary | ICD-10-CM

## 2024-10-22 DIAGNOSIS — O24.419 GESTATIONAL DIABETES MELLITUS (GDM) IN THIRD TRIMESTER, GESTATIONAL DIABETES METHOD OF CONTROL UNSPECIFIED: ICD-10-CM

## 2024-10-22 DIAGNOSIS — O24.419 GESTATIONAL DIABETES MELLITUS (GDM) IN THIRD TRIMESTER, GESTATIONAL DIABETES METHOD OF CONTROL UNSPECIFIED: Primary | ICD-10-CM

## 2024-10-22 PROCEDURE — 76819 FETAL BIOPHYS PROFIL W/O NST: CPT | Mod: 26,S$PBB,, | Performed by: OBSTETRICS & GYNECOLOGY

## 2024-10-22 PROCEDURE — 0502F SUBSEQUENT PRENATAL CARE: CPT | Mod: CPTII,,, | Performed by: OBSTETRICS & GYNECOLOGY

## 2024-10-25 DIAGNOSIS — O24.419 GESTATIONAL DIABETES MELLITUS (GDM) IN THIRD TRIMESTER, GESTATIONAL DIABETES METHOD OF CONTROL UNSPECIFIED: Primary | ICD-10-CM

## 2024-10-27 ENCOUNTER — OUTSIDE PLACE OF SERVICE (OUTPATIENT)
Dept: OBSTETRICS AND GYNECOLOGY | Facility: CLINIC | Age: 24
End: 2024-10-27
Payer: COMMERCIAL

## 2024-10-27 LAB — A1 MICROGLOB PLACENTAL VAG QL: ABNORMAL

## 2024-10-28 ENCOUNTER — OUTSIDE PLACE OF SERVICE (OUTPATIENT)
Dept: OBSTETRICS AND GYNECOLOGY | Facility: CLINIC | Age: 24
End: 2024-10-28
Payer: COMMERCIAL

## 2024-10-31 ENCOUNTER — PATIENT MESSAGE (OUTPATIENT)
Dept: OBSTETRICS AND GYNECOLOGY | Facility: CLINIC | Age: 24
End: 2024-10-31
Payer: COMMERCIAL

## 2024-11-02 ENCOUNTER — OFFICE VISIT (OUTPATIENT)
Dept: URGENT CARE | Facility: CLINIC | Age: 24
End: 2024-11-02
Payer: COMMERCIAL

## 2024-11-02 VITALS
DIASTOLIC BLOOD PRESSURE: 88 MMHG | SYSTOLIC BLOOD PRESSURE: 134 MMHG | BODY MASS INDEX: 42.68 KG/M2 | HEART RATE: 98 BPM | WEIGHT: 250 LBS | TEMPERATURE: 98 F | HEIGHT: 64 IN | RESPIRATION RATE: 18 BRPM | OXYGEN SATURATION: 98 %

## 2024-11-02 DIAGNOSIS — N61.0 MASTITIS: Primary | ICD-10-CM

## 2024-11-02 PROCEDURE — 99203 OFFICE O/P NEW LOW 30 MIN: CPT | Mod: ,,, | Performed by: PHYSICIAN ASSISTANT

## 2024-11-02 RX ORDER — DICLOXACILLIN SODIUM 500 MG/1
500 CAPSULE ORAL EVERY 6 HOURS
Qty: 40 CAPSULE | Refills: 0 | Status: SHIPPED | OUTPATIENT
Start: 2024-11-02 | End: 2024-11-12

## 2024-11-05 ENCOUNTER — POSTPARTUM VISIT (OUTPATIENT)
Dept: OBSTETRICS AND GYNECOLOGY | Facility: CLINIC | Age: 24
End: 2024-11-05
Payer: COMMERCIAL

## 2024-11-05 VITALS
BODY MASS INDEX: 44.73 KG/M2 | WEIGHT: 262 LBS | SYSTOLIC BLOOD PRESSURE: 126 MMHG | HEART RATE: 103 BPM | DIASTOLIC BLOOD PRESSURE: 86 MMHG | HEIGHT: 64 IN

## 2024-11-05 PROCEDURE — 0502F SUBSEQUENT PRENATAL CARE: CPT | Mod: CPTII,,, | Performed by: OBSTETRICS & GYNECOLOGY

## 2024-11-05 RX ORDER — ESCITALOPRAM OXALATE 10 MG/1
10 TABLET ORAL DAILY
Qty: 30 TABLET | Refills: 2 | Status: SHIPPED | OUTPATIENT
Start: 2024-11-05 | End: 2025-11-05

## 2024-11-05 RX ORDER — OXYCODONE AND ACETAMINOPHEN 5; 325 MG/1; MG/1
1 TABLET ORAL EVERY 4 HOURS PRN
Qty: 12 TABLET | Refills: 0 | Status: SHIPPED | OUTPATIENT
Start: 2024-11-05

## 2024-11-05 NOTE — PROGRESS NOTES
Subjective:       Patient ID: Wander Stafford is a 24 y.o. female.    Chief Complaint:  Postpartum Care      History of Present Illness  She is doing well. No abnormal bleeding, No GI or Gu concerns,    No fever  post partum blues/anxiety discussed  Her circ did well   she is doing fine but some pain although getting better  no SI or HI    tearful overwhelmed at times   emotional today     good family support    GYN & OB History  Patient's last menstrual period was 2024.     Date of Last Pap: 2024    OB History    Para Term  AB Living   1 0 0 0 0 0   SAB IAB Ectopic Multiple Live Births   0 0 0 0 0      # Outcome Date GA Lbr Billy/2nd Weight Sex Type Anes PTL Lv   1                 Review of Systems  Review of Systems          Objective:    Physical Exam    Her incision looks very good.  Healing nicely  no evidence of infection    No sig abdominal tenderness        Assessment:        1. 2 weeks postpartum follow-up    2. Post partum depression                 Plan:             She is to return for any reason.     I would like her to follow up in 4 weeks or sooner if needed   pelvic rest  Precautions discussed.   I am ok with driving as long as she is off pain meds       Fever or foul D/C needs to be evaluated.

## 2024-11-06 ENCOUNTER — PATIENT MESSAGE (OUTPATIENT)
Dept: OBSTETRICS AND GYNECOLOGY | Facility: CLINIC | Age: 24
End: 2024-11-06
Payer: COMMERCIAL

## 2024-11-26 DIAGNOSIS — I10 HYPERTENSION, UNSPECIFIED TYPE: Primary | ICD-10-CM

## 2024-11-26 RX ORDER — NIFEDIPINE 60 MG/1
60 TABLET, EXTENDED RELEASE ORAL
Qty: 30 TABLET | Refills: 0 | Status: SHIPPED | OUTPATIENT
Start: 2024-11-26

## 2024-12-10 ENCOUNTER — PATIENT MESSAGE (OUTPATIENT)
Dept: OBSTETRICS AND GYNECOLOGY | Facility: CLINIC | Age: 24
End: 2024-12-10

## 2024-12-10 ENCOUNTER — POSTPARTUM VISIT (OUTPATIENT)
Dept: OBSTETRICS AND GYNECOLOGY | Facility: CLINIC | Age: 24
End: 2024-12-10
Payer: COMMERCIAL

## 2024-12-10 VITALS
SYSTOLIC BLOOD PRESSURE: 121 MMHG | TEMPERATURE: 97 F | BODY MASS INDEX: 42.5 KG/M2 | WEIGHT: 247.63 LBS | DIASTOLIC BLOOD PRESSURE: 82 MMHG

## 2024-12-10 DIAGNOSIS — B37.2 SKIN YEAST INFECTION: Primary | ICD-10-CM

## 2024-12-10 RX ORDER — TRIAMCINOLONE ACETONIDE 1 MG/G
CREAM TOPICAL
Qty: 30 G | Refills: 0 | Status: SHIPPED | OUTPATIENT
Start: 2024-12-10

## 2024-12-10 RX ORDER — CLOTRIMAZOLE 1 %
CREAM (GRAM) TOPICAL
Qty: 30 G | Refills: 0 | Status: SHIPPED | OUTPATIENT
Start: 2024-12-10

## 2024-12-10 RX ORDER — FLUCONAZOLE 150 MG/1
150 TABLET ORAL DAILY
Qty: 4 TABLET | Refills: 0 | Status: SHIPPED | OUTPATIENT
Start: 2024-12-10 | End: 2024-12-14

## 2024-12-10 RX ORDER — NORETHINDRONE 0.35 MG/1
1 TABLET ORAL DAILY
Qty: 30 TABLET | Refills: 11 | Status: SHIPPED | OUTPATIENT
Start: 2024-12-10 | End: 2025-12-10

## 2024-12-10 NOTE — PROGRESS NOTES
Subjective:       Patient ID: Wander Stafford is a 24 y.o. female.    Chief Complaint:  Postpartum Care (6 WEEKS POST C SECTION. PATIENT HAS CONCERNS WITH INCISION. )      History of Present Illness  She is doing well. No abnormal bleeding, No GI or Gu concerns,    No other concerns  Post partum blues/anxiety discussed  Nursing  doing well no concerns  breast feeding   feels good but she has yeast     GYN & OB History  Patient's last menstrual period was 2024.     Date of Last Pap: 2024    OB History    Para Term  AB Living   1 0 0 0 0 0   SAB IAB Ectopic Multiple Live Births   0 0 0 0 0      # Outcome Date GA Lbr Billy/2nd Weight Sex Type Anes PTL Lv   1                 Review of Systems  Review of Systems          Objective:    Physical Exam:             Abdominal:   yeast         Genitourinary:          Genitourinary Comments: Yeast inflammation                        Her incision looks very good.  Healing nicely  no evidence of infection    No sig abdominal tenderness     Pelvic exam  Vulva normal, Vagina normal,  Uterus is normal size, non tender and no adnexal masses.       Assessment:        1. Skin yeast infection    2. 6 weeks postpartum follow-up                 Plan:           Diflucan q other day for 4 doses with her yeast.   Also will use the vulvitis cream     needs her annual in April   birth control   She is to return for any reason.     Birth control is discussed  She is to follow up for her annual pap and exam.  This appointment is set with her before leaving today  Chaperone was present for the exam

## 2024-12-28 RX ORDER — ESCITALOPRAM OXALATE 10 MG/1
10 TABLET ORAL
Qty: 90 TABLET | Refills: 1 | Status: SHIPPED | OUTPATIENT
Start: 2024-12-28

## 2025-03-16 ENCOUNTER — OFFICE VISIT (OUTPATIENT)
Dept: URGENT CARE | Facility: CLINIC | Age: 25
End: 2025-03-16
Payer: COMMERCIAL

## 2025-03-16 VITALS
HEIGHT: 64 IN | BODY MASS INDEX: 42.17 KG/M2 | DIASTOLIC BLOOD PRESSURE: 82 MMHG | WEIGHT: 247 LBS | HEART RATE: 81 BPM | RESPIRATION RATE: 18 BRPM | SYSTOLIC BLOOD PRESSURE: 120 MMHG | OXYGEN SATURATION: 96 % | TEMPERATURE: 98 F

## 2025-03-16 DIAGNOSIS — J02.9 SORE THROAT: Primary | ICD-10-CM

## 2025-03-16 LAB
CTP QC/QA: YES
CTP QC/QA: YES
MOLECULAR STREP A: NEGATIVE
POC MOLECULAR INFLUENZA A AGN: NEGATIVE
POC MOLECULAR INFLUENZA B AGN: NEGATIVE

## 2025-03-16 PROCEDURE — 99213 OFFICE O/P EST LOW 20 MIN: CPT | Mod: ,,, | Performed by: CLINIC/CENTER

## 2025-03-16 PROCEDURE — 87502 INFLUENZA DNA AMP PROBE: CPT | Mod: QW,,, | Performed by: CLINIC/CENTER

## 2025-03-16 PROCEDURE — 87651 STREP A DNA AMP PROBE: CPT | Mod: QW,,, | Performed by: CLINIC/CENTER

## 2025-03-16 RX ORDER — PREDNISONE 20 MG/1
20 TABLET ORAL 2 TIMES DAILY
Qty: 8 TABLET | Refills: 0 | Status: SHIPPED | OUTPATIENT
Start: 2025-03-16 | End: 2025-03-20

## 2025-03-16 NOTE — PROGRESS NOTES
"Subjective:      Patient ID: Wander Stafford is a 24 y.o. female.    Vitals:  height is 5' 4" (1.626 m) and weight is 112 kg (247 lb). Her temperature is 97.6 °F (36.4 °C). Her blood pressure is 120/82 and her pulse is 81. Her respiration is 18 and oxygen saturation is 96%.     Chief Complaint: Sore Throat     Patient is a 24 y.o. female who presents to urgent care with complaints of sore throat, loss of voice, coughing, running nose x2 days. Patient is breast feeding. Alleviating factors include ibuprophen with mild amount of relief.  Patient denies shortness of breath or fever        HENT:  Positive for sinus pain, sinus pressure and sore throat.    Respiratory:  Positive for cough.       Objective:     Physical Exam   Constitutional: She is oriented to person, place, and time. She appears well-developed. She is cooperative.  Non-toxic appearance. She does not appear ill. No distress.   HENT:   Head: Normocephalic and atraumatic.   Ears:   Right Ear: Hearing, tympanic membrane, external ear and ear canal normal.   Left Ear: Hearing, tympanic membrane, external ear and ear canal normal.   Nose: Nose normal. No mucosal edema, rhinorrhea or nasal deformity. No epistaxis. Right sinus exhibits no maxillary sinus tenderness and no frontal sinus tenderness. Left sinus exhibits no maxillary sinus tenderness and no frontal sinus tenderness.   Mouth/Throat: Uvula is midline, oropharynx is clear and moist and mucous membranes are normal. No trismus in the jaw. Normal dentition. No uvula swelling. No oropharyngeal exudate, posterior oropharyngeal edema or posterior oropharyngeal erythema.   Eyes: Conjunctivae and lids are normal. No scleral icterus.   Neck: Trachea normal and phonation normal. Neck supple. No edema present. No erythema present. No neck rigidity present.   Cardiovascular: Normal rate, regular rhythm, normal heart sounds and normal pulses.   Pulmonary/Chest: Effort normal and breath sounds normal. No respiratory " distress. She has no decreased breath sounds. She has no rhonchi.   Abdominal: Normal appearance.   Musculoskeletal: Normal range of motion.         General: No deformity. Normal range of motion.   Neurological: She is alert and oriented to person, place, and time. She exhibits normal muscle tone. Coordination normal.   Skin: Skin is warm, dry, intact, not diaphoretic and not pale.   Psychiatric: Her speech is normal and behavior is normal. Judgment and thought content normal.   Nursing note and vitals reviewed.         Previous History      Review of patient's allergies indicates:  No Known Allergies    Past Medical History:   Diagnosis Date    DM (diabetes mellitus), gestational     HSV-2 infection     Possible exposure to STD     Yeast infection      Current Outpatient Medications   Medication Instructions    clotrimazole (LOTRIMIN) 1 % cream Mix 30gm of clotrimazole 1%, 30gm of triamcinolone 0.1%, 2oz of zinc oxide 20%. AAA 2-3X QD X3-5 days, then QD prn.    EScitalopram oxalate (LEXAPRO) 10 mg, Oral    norethindrone (MICRONOR) 0.35 mg, Oral, Daily    predniSONE (DELTASONE) 20 mg, Oral, 2 times daily    triamcinolone acetonide 0.1% (KENALOG) 0.1 % cream Mix 30gm of clotrimazole, 30gm of triamcinolone 0.1%, 2oz of zinc oxide. AAA 2-3X QD X3-5 days, then QD prn.     Past Surgical History:   Procedure Laterality Date     SECTION  10/27/2024    WISDOM TOOTH EXTRACTION       Family History   Problem Relation Name Age of Onset    Diabetes Father      Diabetes Mellitus Other          UNSPECIFIED    Ovarian cancer Maternal Great-Grandmother      Breast cancer Neg Hx      Cancer Neg Hx      Colon cancer Neg Hx      Eclampsia Neg Hx      Hypertension Neg Hx      Miscarriages / Stillbirths Neg Hx       labor Neg Hx      Stroke Neg Hx      Uterine cancer Neg Hx      Cervical cancer Neg Hx         Social History[1]     Physical Exam      Vital Signs Reviewed   /82   Pulse 81   Temp 97.6 °F (36.4 °C)    "Resp 18   Ht 5' 4" (1.626 m)   Wt 112 kg (247 lb)   LMP 02/22/2025   SpO2 96%   Breastfeeding Yes   BMI 42.40 kg/m²        Procedures    Procedures     Labs     Results for orders placed or performed in visit on 03/16/25   POCT Influenza A/B Molecular    Collection Time: 03/16/25 10:15 AM   Result Value Ref Range    POC Molecular Influenza A Ag Negative Negative    POC Molecular Influenza B Ag Negative Negative     Acceptable Yes    POCT Strep A, Molecular    Collection Time: 03/16/25 10:15 AM   Result Value Ref Range    Molecular Strep A, POC Negative Negative     Acceptable Yes       Assessment:     1. Sore throat        Plan:     Drink plenty of fluids.     Get plenty of rest.     Tylenol or Motrin as needed.     Go to the ER with any significant change or worsening of symptoms.     Follow up with your primary care doctor.    Sore throat  -     POCT Influenza A/B Molecular  -     POCT Strep A, Molecular  -     predniSONE (DELTASONE) 20 MG tablet; Take 1 tablet (20 mg total) by mouth 2 (two) times daily. for 4 days  Dispense: 8 tablet; Refill: 0                         [1]   Social History  Tobacco Use    Smoking status: Former     Types: Vaping with nicotine    Smokeless tobacco: Never    Tobacco comments:     Patient counseled smoking cessation   Substance Use Topics    Alcohol use: Not Currently    Drug use: Not Currently     Types: Marijuana     "